# Patient Record
Sex: FEMALE | Race: WHITE | Employment: PART TIME | URBAN - NONMETROPOLITAN AREA
[De-identification: names, ages, dates, MRNs, and addresses within clinical notes are randomized per-mention and may not be internally consistent; named-entity substitution may affect disease eponyms.]

---

## 2020-11-20 ENCOUNTER — OFFICE VISIT (OUTPATIENT)
Dept: PODIATRY | Age: 65
End: 2020-11-20
Payer: MEDICARE

## 2020-11-20 ENCOUNTER — HOSPITAL ENCOUNTER (OUTPATIENT)
Dept: GENERAL RADIOLOGY | Age: 65
Discharge: HOME | End: 2020-11-22
Payer: MEDICARE

## 2020-11-20 VITALS
RESPIRATION RATE: 20 BRPM | WEIGHT: 168.4 LBS | SYSTOLIC BLOOD PRESSURE: 124 MMHG | DIASTOLIC BLOOD PRESSURE: 64 MMHG | HEART RATE: 68 BPM

## 2020-11-20 PROCEDURE — 1036F TOBACCO NON-USER: CPT | Performed by: PODIATRIST

## 2020-11-20 PROCEDURE — 1090F PRES/ABSN URINE INCON ASSESS: CPT | Performed by: PODIATRIST

## 2020-11-20 PROCEDURE — 99203 OFFICE O/P NEW LOW 30 MIN: CPT | Performed by: PODIATRIST

## 2020-11-20 PROCEDURE — 73630 X-RAY EXAM OF FOOT: CPT

## 2020-11-20 PROCEDURE — 3017F COLORECTAL CA SCREEN DOC REV: CPT | Performed by: PODIATRIST

## 2020-11-20 PROCEDURE — 1123F ACP DISCUSS/DSCN MKR DOCD: CPT | Performed by: PODIATRIST

## 2020-11-20 PROCEDURE — G8421 BMI NOT CALCULATED: HCPCS | Performed by: PODIATRIST

## 2020-11-20 PROCEDURE — G8400 PT W/DXA NO RESULTS DOC: HCPCS | Performed by: PODIATRIST

## 2020-11-20 PROCEDURE — 11055 PARING/CUTG B9 HYPRKER LES 1: CPT | Performed by: PODIATRIST

## 2020-11-20 PROCEDURE — 4040F PNEUMOC VAC/ADMIN/RCVD: CPT | Performed by: PODIATRIST

## 2020-11-20 PROCEDURE — G8484 FLU IMMUNIZE NO ADMIN: HCPCS | Performed by: PODIATRIST

## 2020-11-20 PROCEDURE — 99214 OFFICE O/P EST MOD 30 MIN: CPT

## 2020-11-20 PROCEDURE — G8427 DOCREV CUR MEDS BY ELIG CLIN: HCPCS | Performed by: PODIATRIST

## 2020-11-20 RX ORDER — VERAPAMIL HYDROCHLORIDE 40 MG/1
TABLET ORAL
COMMUNITY
Start: 2020-11-20

## 2020-11-20 RX ORDER — PANTOPRAZOLE SODIUM 40 MG/1
TABLET, DELAYED RELEASE ORAL
COMMUNITY
Start: 2020-02-06

## 2020-11-20 RX ORDER — ERENUMAB-AOOE 140 MG/ML
INJECTION, SOLUTION SUBCUTANEOUS
COMMUNITY
Start: 2020-09-11

## 2020-11-20 RX ORDER — CITALOPRAM 40 MG/1
TABLET ORAL
COMMUNITY
Start: 2020-10-27

## 2020-11-20 RX ORDER — SUMATRIPTAN 50 MG/1
TABLET, FILM COATED ORAL
COMMUNITY
Start: 2020-11-05

## 2020-11-20 RX ORDER — EPINEPHRINE 0.3 MG/.3ML
0.3 INJECTION SUBCUTANEOUS PRN
COMMUNITY
Start: 2020-08-12

## 2020-11-20 RX ORDER — CELECOXIB 200 MG/1
CAPSULE ORAL
COMMUNITY
Start: 2020-11-02

## 2020-11-20 RX ORDER — DICYCLOMINE HYDROCHLORIDE 10 MG/1
10 CAPSULE ORAL
COMMUNITY

## 2020-11-20 SDOH — HEALTH STABILITY: MENTAL HEALTH: HOW OFTEN DO YOU HAVE A DRINK CONTAINING ALCOHOL?: NOT ASKED

## 2020-11-20 NOTE — PROGRESS NOTES
Subjective:  Dmitriy Nunes is a 72 y.o. female who presents to the office today complaining of a hammertoe deformity Right 2. Symptoms beganyear(s) ago. Patient relates pain is Present. Pain is rated 4 out of 10 and is described as intermittent. Treatments prior to today's visit include: otc acid made it feel worse. Currently denies F/C/N/V. Allergies   Allergen Reactions    Tomato Anaphylaxis    Bee Venom Other (See Comments)     Other reaction(s): Unknown Reaction    Cefazolin     Ibuprofen Diarrhea and Nausea Only     Other reaction(s): Vomiting    Meperidine Itching    Morphine     Penicillins      Other reaction(s): Unknown Reaction  Other reaction(s): Unknown Reaction      Strawberry Extract      Other reaction(s): Unknown Reaction  Other reaction(s): Unknown Reaction      Sulfa Antibiotics     Vancomycin     Aspirin Diarrhea, Nausea Only and Nausea And Vomiting     Other reaction(s): Unknown Reaction    Cephalexin Nausea Only       Past Medical History:   Diagnosis Date    Endometriosis     GERD (gastroesophageal reflux disease)     Migraines     Seasonal allergies        Prior to Admission medications    Medication Sig Start Date End Date Taking? Authorizing Provider   verapamil (CALAN) 40 MG tablet One tablet per mouth 2 times a day. 11/20/20  Yes Historical Provider, MD   SUMAtriptan (IMITREX) 50 MG tablet take 1 tablet by mouth if needed  [facility] may repeat . ..  (REFER TO PRESCRIPTION NOTES). 11/5/20  Yes Historical Provider, MD   pantoprazole (PROTONIX) 40 MG tablet take 1 tablet by mouth twice a day 2/6/20  Yes Historical Provider, MD   Erenumab-aooe (AIMOVIG) 140 MG/ML SOAJ Inject under the skin once every 28 days.  9/11/20  Yes Historical Provider, MD   EPINEPHrine (EPIPEN) 0.3 MG/0.3ML SOAJ injection Inject 0.3 mg into the muscle as needed 8/12/20  Yes Historical Provider, MD   dicyclomine (BENTYL) 10 MG capsule Take 10 mg by mouth   Yes Historical Provider, MD hammerote. within normal limits medial longitudinal arch, Bilateral.  Ankle ROM within normal limits,Bilateral.  1st MPJ ROM within normal limits, Bilateral.  Dorsally contracted digits present digits 2, Right.  negative Lachman's test.  Other foot deformities none. Integument: Warm, dry, supple, bilateral.  Open lesion absent, bilateral.  Interdigital maceration absent to web spaces bilateral.  Nails within normal limits. Fissures absent, bilateral. Hyperkeratotic tissue is present. Seen dorsal R 2nd toe PIPJ with central core. Asessment: Patient is a 72 y.o. female with:    Diagnosis Orders   1. Hammer toe of right foot  XR FOOT RIGHT (MIN 3 VIEWS)   2. Corns and callosities  XR FOOT RIGHT (MIN 3 VIEWS)   3. Pain of toe of right foot  XR FOOT RIGHT (MIN 3 VIEWS)       Plan: Patient examined and evaluated. Current condition and treatment options discussed in detail. Appropriate shoe gear with room in toe box discussed. Options give for offloading pads (silipos) to wear while in shoe gear. Orders Placed This Encounter   Procedures    XR FOOT RIGHT (MIN 3 VIEWS)     Standing Status:   Future     Standing Expiration Date:   11/21/2021     Scheduling Instructions:      WB       Paring of 1 benign hyperkeratotic lesions (as listed above) took place with #10 blade or tissue nippers. Patient advised OTC methods for treatment of the callous  Contact office with any questions/problems/concerns. RTC in 2week(s).

## 2020-11-20 NOTE — PROGRESS NOTES
Foot Care Worksheet  PCP: Klaus Astudillo, APRN-CNP, APRCLARK - CNP  Last visit:     Nail description:  Thick , Yellow , Crumbly , Marked limitation of ambulation     Pain resulting from thickened and dystrophy of nail plate No    Nails involved  Right   none  (T5-T9)  Left     none  (TA-T4)    Q7 1 Class A Finding - Non traumatic amputation of foot No    Q8 2 Class B Findings - Absent DP pulse No, Absent PT pulse No, Advanced tropic changes (3 required) Yes    Decrease hair growth Yes, Nail changes/thickening Yes, Pigmented changes/discoloration Yes, Skin texture (thin, shiny) No, Skin color (rubor/redness) No    Q9 1 Class B and 2 Class C Findings  Claudication No, Temperature change Yes, Paresthesia No, Burning No, Edema Yes

## 2020-11-25 ENCOUNTER — OFFICE VISIT (OUTPATIENT)
Dept: PODIATRY | Age: 65
End: 2020-11-25
Payer: MEDICARE

## 2020-11-25 VITALS
SYSTOLIC BLOOD PRESSURE: 128 MMHG | HEART RATE: 68 BPM | HEIGHT: 65 IN | BODY MASS INDEX: 27.49 KG/M2 | DIASTOLIC BLOOD PRESSURE: 72 MMHG | WEIGHT: 165 LBS

## 2020-11-25 PROCEDURE — 99213 OFFICE O/P EST LOW 20 MIN: CPT | Performed by: PODIATRIST

## 2020-11-25 PROCEDURE — 4040F PNEUMOC VAC/ADMIN/RCVD: CPT | Performed by: PODIATRIST

## 2020-11-25 PROCEDURE — G8484 FLU IMMUNIZE NO ADMIN: HCPCS | Performed by: PODIATRIST

## 2020-11-25 PROCEDURE — 1036F TOBACCO NON-USER: CPT | Performed by: PODIATRIST

## 2020-11-25 PROCEDURE — G8427 DOCREV CUR MEDS BY ELIG CLIN: HCPCS | Performed by: PODIATRIST

## 2020-11-25 PROCEDURE — 1090F PRES/ABSN URINE INCON ASSESS: CPT | Performed by: PODIATRIST

## 2020-11-25 PROCEDURE — 99214 OFFICE O/P EST MOD 30 MIN: CPT

## 2020-11-25 PROCEDURE — 3017F COLORECTAL CA SCREEN DOC REV: CPT | Performed by: PODIATRIST

## 2020-11-25 PROCEDURE — G8419 CALC BMI OUT NRM PARAM NOF/U: HCPCS | Performed by: PODIATRIST

## 2020-11-25 PROCEDURE — 1123F ACP DISCUSS/DSCN MKR DOCD: CPT | Performed by: PODIATRIST

## 2020-11-25 PROCEDURE — G8400 PT W/DXA NO RESULTS DOC: HCPCS | Performed by: PODIATRIST

## 2020-11-25 NOTE — PROGRESS NOTES
Subjective:  Radha Elkins is a 72 y.o. female who presents to the office today complaining of a hammertoe deformity Right 2. Symptoms improved after callous removed. Patient is interested in xray and tx options. Currently denies F/C/N/V. Allergies   Allergen Reactions    Tomato Anaphylaxis    Bee Venom Other (See Comments)     Other reaction(s): Unknown Reaction    Cefazolin     Ibuprofen Diarrhea and Nausea Only     Other reaction(s): Vomiting    Meperidine Itching    Morphine     Penicillins      Other reaction(s): Unknown Reaction  Other reaction(s): Unknown Reaction      Strawberry Extract      Other reaction(s): Unknown Reaction  Other reaction(s): Unknown Reaction      Sulfa Antibiotics     Vancomycin     Aspirin Diarrhea, Nausea Only and Nausea And Vomiting     Other reaction(s): Unknown Reaction    Cephalexin Nausea Only       Past Medical History:   Diagnosis Date    Endometriosis     GERD (gastroesophageal reflux disease)     Migraines     Seasonal allergies        Prior to Admission medications    Medication Sig Start Date End Date Taking? Authorizing Provider   verapamil (CALAN) 40 MG tablet One tablet per mouth 2 times a day. 11/20/20  Yes Historical Provider, MD   SUMAtriptan (IMITREX) 50 MG tablet take 1 tablet by mouth if needed  [facility] may repeat . ..  (REFER TO PRESCRIPTION NOTES). 11/5/20  Yes Historical Provider, MD   pantoprazole (PROTONIX) 40 MG tablet take 1 tablet by mouth twice a day 2/6/20  Yes Historical Provider, MD   Erenumab-aooe (AIMOVIG) 140 MG/ML SOAJ Inject under the skin once every 28 days.  9/11/20  Yes Historical Provider, MD   EPINEPHrine (EPIPEN) 0.3 MG/0.3ML SOAJ injection Inject 0.3 mg into the muscle as needed 8/12/20  Yes Historical Provider, MD   dicyclomine (BENTYL) 10 MG capsule Take 10 mg by mouth   Yes Historical Provider, MD   citalopram (CELEXA) 40 MG tablet take 1 tablet by mouth once daily 10/27/20  Yes Historical Provider, MD ROM within normal limits, Bilateral.  Dorsally contracted digits present digits 2, Right.  negative Lachman's test.  Other foot deformities none. Integument: Warm, dry, supple, bilateral.  Open lesion absent, bilateral.  Interdigital maceration absent to web spaces bilateral.  Nails within normal limits. Fissures absent, bilateral. Hyperkeratotic tissue is present. Seen dorsal R 2nd toe PIPJ with central core. Xray: see report    Asessment: Patient is a 72 y.o. female with:    Diagnosis Orders   1. Hammer toe of right foot     2. Pain of toe of right foot         Plan: Patient examined and evaluated. Current condition and treatment options discussed in detail. Appropriate shoe gear with room in toe box discussed. Options give for offloading pads to wear while in shoe gear. X rays reviewed with the pt in detail. All questions answered. Pt considering sx intervention at this time for the painful R 2 hammertoe. Procedure will be a arthroplasty right, 2nd toe foot. Patient was educated on the pre-op, anesthesia, and post op course. Risks and complications were discussed such as wound infection, wound dehiscence, re-ocurrence of deformity, hematoma/seroma, neurovascular injury, and RSD. All questions were answered and patient should call back quicker if any further questions arise. Patient advised OTC methods for treatment of the callous  Contact office with any questions/problems/concerns.   RTC in PRN